# Patient Record
Sex: MALE | Race: WHITE
[De-identification: names, ages, dates, MRNs, and addresses within clinical notes are randomized per-mention and may not be internally consistent; named-entity substitution may affect disease eponyms.]

---

## 2020-06-01 ENCOUNTER — NON-APPOINTMENT (OUTPATIENT)
Age: 40
End: 2020-06-01

## 2020-06-01 ENCOUNTER — APPOINTMENT (OUTPATIENT)
Dept: HEART AND VASCULAR | Facility: CLINIC | Age: 40
End: 2020-06-01
Payer: SELF-PAY

## 2020-06-01 VITALS
HEIGHT: 67 IN | BODY MASS INDEX: 27.31 KG/M2 | SYSTOLIC BLOOD PRESSURE: 128 MMHG | DIASTOLIC BLOOD PRESSURE: 88 MMHG | OXYGEN SATURATION: 98 % | RESPIRATION RATE: 12 BRPM | HEART RATE: 76 BPM | WEIGHT: 174 LBS

## 2020-06-01 DIAGNOSIS — Z78.9 OTHER SPECIFIED HEALTH STATUS: ICD-10-CM

## 2020-06-01 DIAGNOSIS — Z82.49 FAMILY HISTORY OF ISCHEMIC HEART DISEASE AND OTHER DISEASES OF THE CIRCULATORY SYSTEM: ICD-10-CM

## 2020-06-01 DIAGNOSIS — R01.1 CARDIAC MURMUR, UNSPECIFIED: ICD-10-CM

## 2020-06-01 PROBLEM — Z00.00 ENCOUNTER FOR PREVENTIVE HEALTH EXAMINATION: Status: ACTIVE | Noted: 2020-06-01

## 2020-06-01 PROCEDURE — 93306 TTE W/DOPPLER COMPLETE: CPT

## 2020-06-01 PROCEDURE — 99203 OFFICE O/P NEW LOW 30 MIN: CPT

## 2020-06-01 PROCEDURE — 93000 ELECTROCARDIOGRAM COMPLETE: CPT

## 2020-06-01 RX ORDER — METOPROLOL TARTRATE 50 MG/1
50 TABLET, FILM COATED ORAL TWICE DAILY
Refills: 0 | Status: DISCONTINUED | COMMUNITY
End: 2020-06-01

## 2020-06-01 NOTE — DISCUSSION/SUMMARY
[FreeTextEntry1] : 1. Fatigue: Awaiting results of recent blood work for review. Would rule out Natalie-Barr virus as well.\par 2. Hypertension: Will taper metoprolol to discontinue, advised to check blood pressure twice daily and keep a log if blood pressure persistently above 140/90 will start amlodipine 5 mg daily.\par 3. Cardiac murmur: Echocardiogram

## 2020-06-01 NOTE — PHYSICAL EXAM
[Normal Appearance] : normal appearance [General Appearance - Well Developed] : well developed [General Appearance - Well Nourished] : well nourished [No Deformities] : no deformities [Well Groomed] : well groomed [General Appearance - In No Acute Distress] : no acute distress [Normal Oral Mucosa] : normal oral mucosa [No Oral Cyanosis] : no oral cyanosis [No Oral Pallor] : no oral pallor [Normal Jugular Venous V Waves Present] : normal jugular venous V waves present [Normal Jugular Venous A Waves Present] : normal jugular venous A waves present [No Jugular Venous Sparrow A Waves] : no jugular venous sparrow A waves [Normal] : normal [5th Left ICS - MCL] : palpated at the 5th LICS in the midclavicular line [Normal S1] : normal S1 [Normal Rate] : normal [Rhythm Regular] : regular [Normal S2] : normal S2 [III] : a grade 3 [Right Carotid Bruit] : no bruit heard over the right carotid [No Pitting Edema] : no pitting edema present [Left Carotid Bruit] : no bruit heard over the left carotid [Auscultation Breath Sounds / Voice Sounds] : lungs were clear to auscultation bilaterally [Exaggerated Use Of Accessory Muscles For Inspiration] : no accessory muscle use [Respiration, Rhythm And Depth] : normal respiratory rhythm and effort [Abdomen Tenderness] : non-tender [Abdomen Soft] : soft [Abdomen Mass (___ Cm)] : no abdominal mass palpated [Gait - Sufficient For Exercise Testing] : the gait was sufficient for exercise testing [Abnormal Walk] : normal gait [Nail Clubbing] : no clubbing of the fingernails [Cyanosis, Localized] : no localized cyanosis [] : no ischemic changes [Petechial Hemorrhages (___cm)] : no petechial hemorrhages [Oriented To Time, Place, And Person] : oriented to person, place, and time [No Anxiety] : not feeling anxious [Mood] : the mood was normal [Affect] : the affect was normal

## 2020-06-01 NOTE — HISTORY OF PRESENT ILLNESS
[FreeTextEntry1] : This 39-year-old male with past medical history of Corona virus. Patient believed to have coronavirus ten weeks ago. Since then patient has been fatigued and complaining of palpitations which have slowly improved only to plateau about 4-5 weeks ago. Patient continues to complain of fatigue. Patient was started on metoprolol 50 mg twice daily for hypertension around 8 weeks ago. Patient denies any chest pain shortness of breath PND or orthopnea.patient had extensive blood work done by PCP awaiting copy for review.

## 2020-06-15 ENCOUNTER — APPOINTMENT (OUTPATIENT)
Dept: HEART AND VASCULAR | Facility: CLINIC | Age: 40
End: 2020-06-15
Payer: SELF-PAY

## 2020-06-15 VITALS
HEART RATE: 98 BPM | DIASTOLIC BLOOD PRESSURE: 100 MMHG | HEIGHT: 67 IN | SYSTOLIC BLOOD PRESSURE: 160 MMHG | WEIGHT: 173 LBS | RESPIRATION RATE: 12 BRPM | BODY MASS INDEX: 27.15 KG/M2

## 2020-06-15 DIAGNOSIS — Z87.898 PERSONAL HISTORY OF OTHER SPECIFIED CONDITIONS: ICD-10-CM

## 2020-06-15 PROCEDURE — 99213 OFFICE O/P EST LOW 20 MIN: CPT

## 2020-06-15 NOTE — DISCUSSION/SUMMARY
[FreeTextEntry1] : 1. Hypertension: Checked patient's blood pressure cuff against the manual pressure correlates well. Suggest starting amlodipine 5 mg daily. Patient is very reluctant as he continues to improve from fatigue off medication wants to wait an additional 2 weeks to see if blood pressure results without intervention. Advised to call or return in 2 weeks for repeat blood pressure and we'll discuss need for medication at that time.\par 2. Fatigue: Continues to improve.

## 2020-06-15 NOTE — HISTORY OF PRESENT ILLNESS
[FreeTextEntry1] : 39-year-old male with a past medical history hypertension and recovering from Corona virus comes in for followup. Since his last visit he weaned himself off Toprol and saw a marked improvement in fatigue and nearly is back to baseline. On average blood pressures have been running 140-160/ off medication.

## 2020-07-02 ENCOUNTER — APPOINTMENT (OUTPATIENT)
Dept: HEART AND VASCULAR | Facility: CLINIC | Age: 40
End: 2020-07-02
Payer: SELF-PAY

## 2020-07-02 VITALS — SYSTOLIC BLOOD PRESSURE: 154 MMHG | HEART RATE: 96 BPM | DIASTOLIC BLOOD PRESSURE: 99 MMHG

## 2020-07-02 DIAGNOSIS — I10 ESSENTIAL (PRIMARY) HYPERTENSION: ICD-10-CM

## 2020-07-02 PROCEDURE — 99213 OFFICE O/P EST LOW 20 MIN: CPT | Mod: 95

## 2020-07-02 NOTE — DISCUSSION/SUMMARY
[FreeTextEntry1] : 1. Hypertension: Advised to take amlodipine now 5 mg has not taken it today, recheck his blood pressure and evening tomorrow advised to take amlodipine in the morning and check his blood pressure twice daily if in the evening blood pressure remains above 140/90 advised that metoprolol 50 mg in the evening and continue to check blood pressure twice daily and to call back in 3 days to discuss plan moving forward.

## 2020-07-02 NOTE — PHYSICAL EXAM
[General Appearance - Well Developed] : well developed [Normal Appearance] : normal appearance [Well Groomed] : well groomed [General Appearance - In No Acute Distress] : no acute distress [No Deformities] : no deformities [General Appearance - Well Nourished] : well nourished [Normal Jugular Venous A Waves Present] : normal jugular venous A waves present [No Jugular Venous Sparrow A Waves] : no jugular venous sparrow A waves [Normal Jugular Venous V Waves Present] : normal jugular venous V waves present [Exaggerated Use Of Accessory Muscles For Inspiration] : no accessory muscle use [Respiration, Rhythm And Depth] : normal respiratory rhythm and effort [Abnormal Walk] : normal gait [Gait - Sufficient For Exercise Testing] : the gait was sufficient for exercise testing [FreeTextEntry1] : uto [Cyanosis, Localized] : no localized cyanosis [Petechial Hemorrhages (___cm)] : no petechial hemorrhages [Nail Clubbing] : no clubbing of the fingernails [] : no ischemic changes [Affect] : the affect was normal [Oriented To Time, Place, And Person] : oriented to person, place, and time [Mood] : the mood was normal [No Anxiety] : not feeling anxious

## 2020-07-02 NOTE — HISTORY OF PRESENT ILLNESS
[Medical Office: ([facility])___] : at the medical office located in  [Home] : at home, [unfilled] , at the time of the visit. [FreeTextEntry1] : 39-year-old male with a past medical history of hypertension TH obtained to discuss blood pressure readings. Patient only started felodipine one week ago after realizing his blood pressure remains high. The serosa check his blood pressure twice a day however only did it once or twice  a week, his pressure this morning was 157/104 the patient continues to feel weak. Patient has been taking amlodipine for the last one week with trivial response in blood pressure. [Verbal consent obtained from patient] : the patient, [unfilled]

## 2020-11-10 ENCOUNTER — RX RENEWAL (OUTPATIENT)
Age: 40
End: 2020-11-10

## 2020-11-10 RX ORDER — AMLODIPINE BESYLATE 5 MG/1
5 TABLET ORAL DAILY
Qty: 30 | Refills: 5 | Status: ACTIVE | COMMUNITY
Start: 2020-06-01 | End: 1900-01-01

## 2021-10-06 PROBLEM — I10 ESSENTIAL HYPERTENSION: Status: ACTIVE | Noted: 2020-06-01
